# Patient Record
Sex: FEMALE | URBAN - METROPOLITAN AREA
[De-identification: names, ages, dates, MRNs, and addresses within clinical notes are randomized per-mention and may not be internally consistent; named-entity substitution may affect disease eponyms.]

---

## 2020-10-09 ENCOUNTER — HOSPITAL ENCOUNTER (OUTPATIENT)
Age: 70
Setting detail: SPECIMEN
Discharge: HOME | End: 2020-10-09
Payer: COMMERCIAL

## 2020-10-12 LAB — SARS-COV-2, NAA: NOT DETECTED

## 2020-10-13 ENCOUNTER — TELEPHONE (OUTPATIENT)
Dept: PRIMARY CARE CLINIC | Age: 70
End: 2020-10-13

## 2020-10-20 ENCOUNTER — HOSPITAL ENCOUNTER (OUTPATIENT)
Age: 70
Setting detail: SPECIMEN
Discharge: HOME | End: 2020-10-20
Payer: COMMERCIAL

## 2020-10-23 LAB — SARS-COV-2, NAA: NOT DETECTED

## 2020-11-18 ENCOUNTER — HOSPITAL ENCOUNTER (OUTPATIENT)
Age: 70
Setting detail: SPECIMEN
Discharge: HOME | End: 2020-11-18
Payer: COMMERCIAL

## 2020-11-21 LAB — SARS-COV-2, NAA: NOT DETECTED

## 2020-12-03 ENCOUNTER — HOSPITAL ENCOUNTER (OUTPATIENT)
Age: 70
Setting detail: SPECIMEN
Discharge: HOME | End: 2020-12-03
Payer: COMMERCIAL

## 2020-12-06 LAB — SARS-COV-2, NAA: NOT DETECTED

## 2020-12-10 ENCOUNTER — HOSPITAL ENCOUNTER (OUTPATIENT)
Age: 70
Setting detail: SPECIMEN
Discharge: HOME | End: 2020-12-10
Payer: COMMERCIAL

## 2020-12-12 LAB — SARS-COV-2, NAA: NOT DETECTED

## 2020-12-17 ENCOUNTER — HOSPITAL ENCOUNTER (OUTPATIENT)
Age: 70
Setting detail: SPECIMEN
Discharge: HOME | End: 2020-12-17
Payer: COMMERCIAL

## 2020-12-18 LAB — SARS-COV-2, NAA: NOT DETECTED

## 2020-12-21 ENCOUNTER — TELEPHONE (OUTPATIENT)
Dept: PRIMARY CARE CLINIC | Age: 70
End: 2020-12-21

## 2020-12-30 ENCOUNTER — HOSPITAL ENCOUNTER (OUTPATIENT)
Age: 70
Setting detail: SPECIMEN
Discharge: HOME | End: 2020-12-30
Payer: COMMERCIAL

## 2021-01-02 LAB — SARS-COV-2, NAA: NOT DETECTED

## 2021-01-07 ENCOUNTER — HOSPITAL ENCOUNTER (OUTPATIENT)
Age: 71
Setting detail: SPECIMEN
Discharge: HOME | End: 2021-01-07
Payer: COMMERCIAL

## 2021-01-09 LAB — SARS-COV-2, NAA: NOT DETECTED

## 2021-01-10 ENCOUNTER — TELEPHONE (OUTPATIENT)
Dept: PRIMARY CARE CLINIC | Age: 71
End: 2021-01-10

## 2021-01-20 ENCOUNTER — HOSPITAL ENCOUNTER (OUTPATIENT)
Age: 71
Setting detail: SPECIMEN
Discharge: HOME | End: 2021-01-20
Payer: COMMERCIAL

## 2021-01-22 LAB
SARS-COV-2, RAPID: NORMAL
SARS-COV-2: NORMAL
SARS-COV-2: NOT DETECTED
SOURCE: NORMAL

## 2021-01-23 ENCOUNTER — TELEPHONE (OUTPATIENT)
Dept: PRIMARY CARE CLINIC | Age: 71
End: 2021-01-23

## 2021-02-06 ENCOUNTER — HOSPITAL ENCOUNTER (OUTPATIENT)
Age: 71
Setting detail: SPECIMEN
Discharge: HOME | End: 2021-02-06
Payer: COMMERCIAL

## 2021-02-08 LAB
SARS-COV-2, RAPID: NORMAL
SARS-COV-2: NORMAL
SARS-COV-2: NOT DETECTED
SOURCE: NORMAL

## 2021-02-24 ENCOUNTER — HOSPITAL ENCOUNTER (OUTPATIENT)
Age: 71
Setting detail: SPECIMEN
Discharge: HOME | End: 2021-02-24
Payer: COMMERCIAL

## 2021-02-25 LAB
SARS-COV-2: NORMAL
SARS-COV-2: NOT DETECTED
SOURCE: NORMAL

## 2021-02-26 ENCOUNTER — TELEPHONE (OUTPATIENT)
Dept: PRIMARY CARE CLINIC | Age: 71
End: 2021-02-26

## 2021-03-03 ENCOUNTER — HOSPITAL ENCOUNTER (OUTPATIENT)
Age: 71
Setting detail: SPECIMEN
Discharge: HOME | End: 2021-03-03
Payer: COMMERCIAL

## 2021-03-04 LAB
SARS-COV-2: NORMAL
SARS-COV-2: NOT DETECTED
SOURCE: NORMAL

## 2021-03-05 ENCOUNTER — TELEPHONE (OUTPATIENT)
Dept: PRIMARY CARE CLINIC | Age: 71
End: 2021-03-05

## 2023-06-21 ENCOUNTER — HOSPITAL ENCOUNTER
Dept: HOSPITAL 101 - CT | Age: 73
End: 2023-06-21
Payer: COMMERCIAL

## 2023-06-21 DIAGNOSIS — M40.204: ICD-10-CM

## 2023-06-21 DIAGNOSIS — R91.1: Primary | ICD-10-CM

## 2023-06-21 PROCEDURE — 71250 CT THORAX DX C-: CPT

## 2023-06-21 PROCEDURE — 72072 X-RAY EXAM THORAC SPINE 3VWS: CPT

## 2023-06-21 NOTE — CT_ITS
The [address] [address] 
     [phone] 
  
  
Patient Name: 
SHAHEEN CHRISTINA 
  
MRN: [id number]    YOB: 1950    Sex: F 
Assigned Patient Location: CT 
Current Patient Location: CT 
Accession/Order Number: [id number] 
Exam Date: 6/21/2023  13:29    Report Date: 6/21/2023  14:34 
  
At the request of: 
SHARON PAINTER   
  
Procedure:  CT chest wo con 
  
EXAMINATION: CT chest wo con  
  
HISTORY: Lung nodule R91.1 ; follow-up right lower lobe nodule 
  
COMPARISON: CT chest 1/11/2023 
  
TECHNIQUE: Multi-planar CT images were obtained without and/or with IV  
contrast  
as indicated by examination type. Axial, Coronal, and Sagittal images. Dose  
reduction techniques were achieved by using automated exposure control and/or  
adjustment of mA and/or kV according to patient size and/or use of iterative  
reconstruction technique. 
  
  
FINDINGS: 
LUNGS: Stable 5 mm nodule within the right upper lobe adjacent the minor  
fissure. Minimal emphysematous changes. No acute infiltrates. 
PLEURA: No mass, effusion, or pneumothorax. 
VASCULATURE: No abnormality. 
GIFTY: A few calcified right hilar lymph nodes suggestive chronic granulomatous  
  
disease. 
MEDIASTINUM: No mass or adenopathy. 
CARDIAC: Atherosclerotic coronary artery disease. No pericardial effusion. 
AORTA: No aneurysm or dissection. 
CHEST WALL: No mass or axillary adenopathy. 
BONES: No bone lesion or fracture. 
LIMITED ABDOMEN: No suspicious findings Limited images of the upper abdomen. 
OTHER: Negative. 
  
IMPRESSION: 
  
1. Stable 5 mm nodule within right upper lobe (inadvertently stated to be  
within right lower lobe on prior study). Consider follow-up imaging in one  
year  
to document stability. 
  
  
Electronically authenticated by: JULIA HAIDER   Date: 6/21/2023  14:34

## 2023-06-21 NOTE — XR_ITS
The [place] [id number] 
     [phone] 
  
  
Patient Name: 
SHAHEEN CHRISTINA 
  
MRN: [id number]    YOB: 1950    Sex: F 
Assigned Patient Location: CT 
Current Patient Location: CT 
Accession/Order Number: [id number] 
Exam Date: 6/21/2023  13:15    Report Date: 6/21/2023  14:16 
  
At the request of: 
SHARON PAINTER   
  
Procedure:  XR thoracic spine 3V 
  
EXAMINATION: XR thoracic spine 3V  
  
HISTORY: Unspecified kyphosis, thoracic region M40.204 
  
COMPARISON: No relevant comparison available.  
  
FINDINGS:  
BONES: Mild right convex curvature of thoracic spine. Multilevel degenerative  
endplate changes. No compression fracture, spondylolisthesis, bone lesion.  
DISC SPACES: Multilevel moderate-marked narrowing of the mid and lower  
thoracic  
spine.  
PARASPINOUS: Negative. No paraspinous abnormality is seen.  
OTHER: Negative.  
  
IMPRESSION:  
  
1. Moderate degenerative changes of the mid-lower thoracic spine. No  
appreciable acute abnormality. 
  
  
Electronically authenticated by: JULIA HAIDER   Date: 6/21/2023  14:16

## 2023-09-18 ENCOUNTER — HOSPITAL ENCOUNTER (EMERGENCY)
Age: 73
Discharge: HOME | End: 2023-09-18
Payer: COMMERCIAL

## 2023-09-18 VITALS
SYSTOLIC BLOOD PRESSURE: 110 MMHG | RESPIRATION RATE: 18 BRPM | HEART RATE: 72 BPM | OXYGEN SATURATION: 96 % | TEMPERATURE: 97.88 F | DIASTOLIC BLOOD PRESSURE: 68 MMHG

## 2023-09-18 VITALS — BODY MASS INDEX: 17.8 KG/M2

## 2023-09-18 DIAGNOSIS — Z79.82: ICD-10-CM

## 2023-09-18 DIAGNOSIS — Z79.899: ICD-10-CM

## 2023-09-18 DIAGNOSIS — R55: ICD-10-CM
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
## 2023-09-18 DIAGNOSIS — S01.01XA: Primary | ICD-10-CM

## 2023-09-18 LAB
ADD MANUAL DIFF: NO
ANION GAP: 19
BLOOD UREA NITROGEN: 13 MG/DL (ref 7–18)
CALCIUM: 8.9 MG/DL (ref 8.5–10.1)
CARBON DIOXIDE: 21.1 MMOL/L (ref 21–32)
CHLORIDE: 97 MMOL/L (ref 98–107)
CO2 BLD-SCNC: 21.1 MMOL/L (ref 21–32)
ESTIMATED GFR (AFRICAN AMERICA: >60 (ref 60–?)
ESTIMATED GFR (NON-AFRICAN AME: >60 (ref 60–?)
GLUCOSE BLD-MCNC: 96 MG/DL (ref 74–106)
HCT VFR BLD CALC: 41.2 % (ref 36–48)
HEMATOCRIT: 41.2 % (ref 36–48)
HEMOGLOBIN: 14.1 G/DL (ref 12–16)
IMMATURE GRANULOCYTES ABS AUTO: 0.02 10^3/UL (ref 0–0.03)
IMMATURE GRANULOCYTES PCT AUTO: 0.3 % (ref 0–0.5)
LYMPHOCYTES  ABSOLUTE AUTO: 0.8 10^3/UL (ref 1.2–3.8)
MCV RBC: 95.8 FL (ref 81–99)
MEAN CORPUSCULAR HEMOGLOBIN: 32.8 PG (ref 26.7–34)
MEAN CORPUSCULAR HGB CONC: 34.2 G/DL (ref 29.9–35.2)
MEAN CORPUSCULAR VOLUME: 95.8 FL (ref 81–99)
PLATELET # BLD: 257 10^3/UL (ref 150–450)
PLATELET COUNT: 257 10^3/UL (ref 150–450)
POTASSIUM SERPLBLD-SCNC: 4.1 MMOL/L (ref 3.5–5.1)
POTASSIUM: 4.1 MMOL/L (ref 3.5–5.1)
RED BLOOD COUNT: 4.3 10^6/UL (ref 4.2–5.4)
SODIUM BLD-SCNC: 133 MMOL/L (ref 136–145)
SODIUM: 133 MMOL/L (ref 136–145)
WBC # BLD: 7.4 10^3/UL (ref 4–11)
WHITE BLOOD COUNT: 7.4 10^3/UL (ref 4–11)

## 2023-09-18 PROCEDURE — 70450 CT HEAD/BRAIN W/O DYE: CPT

## 2023-09-18 PROCEDURE — 12001 RPR S/N/AX/GEN/TRNK 2.5CM/<: CPT

## 2023-09-18 PROCEDURE — 99285 EMERGENCY DEPT VISIT HI MDM: CPT

## 2023-09-18 PROCEDURE — 36415 COLL VENOUS BLD VENIPUNCTURE: CPT

## 2023-09-18 PROCEDURE — 93005 ELECTROCARDIOGRAM TRACING: CPT

## 2023-09-18 PROCEDURE — 85025 COMPLETE CBC W/AUTO DIFF WBC: CPT

## 2023-09-18 PROCEDURE — 80048 BASIC METABOLIC PNL TOTAL CA: CPT

## 2023-09-18 NOTE — ED.GENADUL1
HPI - General Adult
General
Chief complaint: Head Injury
Stated complaint: UPPER EXTREMITY INJURY TO HEAD
Time Seen by Provider: 09/18/23 10:54
Source: patient
Mode of arrival: walk-in
History of Present Illness
HPI narrative: 
73-year-old female presents for syncope and head injury. She was in her bathroom and she passed out. He hit right side of her head and has some dried blood. She doesn't have a headache or neck pain. She is on a blood thinner. No other injury was 
sustained. She states she feels fine and that this is not a big deal. No chest pain or palpitations. No vomiting or ddiarrhea or fever.
Related Data
Home Medications

 Medication  Instructions  Recorded  Confirmed
aspirin 81 mg chewable tablet 1 tab PO QDAY 09/18/23 09/18/23
atorvastatin 80 mg tablet 80 mg PO QDAY 09/18/23 09/18/23
clopidogrel 75 mg tablet 75 mg PO QDAY 09/18/23 09/18/23
dapagliflozin propanediol 10 mg 10 mg PO QDAY 09/18/23 09/18/23
tablet (Farxiga)   
isosorbide mononitrate 30 mg 30 mg PO QAM 09/18/23 09/18/23
tablet,extended release 24 hr   
lisinopril 2.5 mg tablet 2.5 mg PO QDAY 09/18/23 09/18/23
metoprolol succinate 25 mg 25 mg PO QDAY 09/18/23 09/18/23
tablet,extended release 24 hr   
spironolactone 25 mg tablet 25 mg PO QAM 09/18/23 09/18/23


Allergies

Allergy/AdvReac Type Severity Reaction Status Date / Time
EES AdvReac Intermediate  Uncoded 09/18/23 10:47



Review of Systems
ROS  
 Narrative A ten point review of systems is negative except as noted above.   

Exam
Narrative
Exam Narrative: 
Nurses note and vital signs reviewed and patient is not hypoxic.

General:  The patient appears well and in no apparent distress.  Patient is resting comfortably on cart.
Skin:  Warm, dry, no pallor noted.  There is no rash noted.
Head:  Normocephalic, ***********
Eye: Normal conjunctiva, no drainage
Ears, Nose, Mouth, and Throat: oral mucosa is moist. Nares patent. 
Cardiovascular:  Regular Rate and Rhythm
Respiratory:  Patient is in no distress, no accessory muscle use, lungs are clear to auscultation, no wheezing, rales or rhonchi
Back:  non-tender, cervical spine nontender
GI:  , ostomy in place, nontender
Musculoskeletal: palpable tenderness to her extremities
Neurological:  A&O x4, normal speech
Psychiatric:  Cooperative
Constitutional
Vital Signs, click to edit/add: 

Last Vital Signs

Temp  97.8 F   09/18/23 10:45
Pulse  72   09/18/23 10:45
Resp  18   09/18/23 10:45
BP  110/68   09/18/23 10:45
Pulse Ox  96   09/18/23 10:45




Course
Vital Signs
Vital signs: 

Vital Signs

Temperature  97.8 F   09/18/23 10:45
Pulse Rate  72   09/18/23 10:45
Respiratory Rate  18   09/18/23 10:45
Blood Pressure  110/68   09/18/23 10:45
Pulse Oximetry  96   09/18/23 10:45



Temperature  97.8 F   09/18/23 10:45
Pulse Rate  72   09/18/23 10:45
Respiratory Rate  18   09/18/23 10:45
Blood Pressure  110/68   09/18/23 10:45
Pulse Oximetry  96   09/18/23 10:45




Medical Decision Making
MDM Narrative
Medical decision making narrative: 
CAT scan negative. Staples placed. They are to be removed in ten days. Tetanus status is up-to-date already. The rest of her workup negative as well. Treatment diagnosis and follow-up were discussed with the patient. She doesn't feel dizzy or 
lightheaded.
Differential Diagnosis
Differential Diagnosis: intracranial hemorrhage, syncope, anemia, acute kidney injury
Lab Data
Lab results reviewed: Yes I reviewed the patient's lab results
Labs: 

Lab Results

  09/18/23 Range/Units
  11:17 
WBC  7.4  (4.0-11.0)  10^3/uL
RBC  4.30  (4.20-5.40)  10^6/uL
Hgb  14.1  (12.0-16.0)  g/dL
Hct  41.2  (36.0-48.0)  %
MCV  95.8  (81.0-99.0)  fL
MCH  32.8  (26.7-34.0)  pg
MCHC  34.2  (29.9-35.2)  g/dL
RDW  12.9  (11.0-15.0)  %
Plt Count  257  (150-450)  10^3/uL
MPV  8.9 L  (9.5-13.5)  fL
Neut % (Auto)  76.3 H  (43.0-75.0)  %
Lymph % (Auto)  10.4 L  (20.5-60.0)  %
Mono % (Auto)  12.3 H  (1.7-12.0)  %
Eos % (Auto)  0.3 L  (0.9-7.0)  %
Baso % (Auto)  0.4  (0.2-2.0)  %
Neut # (Auto)  5.6  (1.4-6.5)  10^3/uL
Lymph # (Auto)  0.8 L  (1.2-3.8)  10^3/uL
Mono # (Auto)  0.9 H  (0.3-0.8)  10^3/uL
Eos # (Auto)  0.0  (0.0-0.7)  10^3/uL
Baso # (Auto)  0.0  (0.0-0.1)  10^3/uL
Abs Immat Gran (auto)  0.02  (0.00-0.03)  10^3/uL
Imm/Tot Granulo (auto)  0.3  (0.0-0.5)  %
Sodium  133 L  (136-145)  mmol/L
Potassium  4.1  (3.5-5.1)  mmol/L
Chloride  97 L  ()  mmol/L
Carbon Dioxide  21.1  (21.0-32.0)  mmol/L
Anion Gap  19.0  
BUN  13.0  (7.0-18.0)  mg/dL
Creatinine  0.82  (0.55-1.02)  mg/dL
Est GFR ( Amer)  >60  (>=60)  
Est GFR (Non-Af Amer)  >60  (>=60)  
BUN/Creatinine Ratio  15.9  
Glucose  96  ()  mg/dL
Calcium  8.9  (8.5-10.1)  mg/dL



Imaging Data
CT scan - head: 
      Radiologist's impression: 
Procedure:  CT head/brain wo con
EXAM: CT scan of the
 head 
without contrast. 
Dose reduction technique
 used: Automated exposure
 control and/or adjustment of
the mA and/or kV according
 to patient size
 and/or use of
 iterative 
reconstruction technique.
REASON FOR EXAM: head injury
COMPARISON: None
FINDINGS: 
No intracranial hemorrhage, mass
 effect, midline shift, fractures or
 evidence 
of acute
 ischemic
 infarct.
 No 
hydrocephalus. Minimal generalized cerebral
 and 
cerebellar volume loss. Minimal small vessel gliosis. Paranasal sinuses and
mastoid air
 cells
 are
 clear.
Remainder unremarkable.
IMPRESSION:
No acute intracranial
 abnormalities. 
Electronically authenticated by: MICHELE PARRISH   Date: 9/18/2023  11:38
ECG Data
Attestation: I personally reviewed and interpreted this ECG as follows: (EKG on my interpretation shows sinus rhythm with a rate of 71)

Discharge Plan
Discharge
Chief Complaint: Head Injury

Clinical Impression:
 Syncope, Laceration of scalp


Patient Disposition: Home, Self-Care

Time of Disposition Decision: 12:50

Condition: Good

Mode of Transportation: Private Vehicle

Prescriptions / Home Meds:
No Action
  aspirin 81 mg tablet,chewable 
   1 tab PO QDAY 
  clopidogrel 75 mg tablet 
   75 mg PO QDAY 
  isosorbide mononitrate 30 mg tablet extended release 24 hr 
   30 mg PO QAM 
  lisinopril 2.5 mg tablet 
   2.5 mg PO QDAY 
  metoprolol succinate 25 mg tablet extended release 24 hr 
   25 mg PO QDAY 
  spironolactone 25 mg tablet 
   25 mg PO QAM 
  Farxiga 10 mg tablet 
   10 mg PO QDAY 
  atorvastatin 80 mg tablet 
   80 mg PO QDAY 

Instructions:  Laceration (ED), Syncope (ED), Staple Care (ED)

Additional Instructions:
Staples to be removed in ten days. Do not wash hair for twenty-four hours.

Stand Alone Forms:  Portal Instructions

Referrals:
SHARON PAINTER [Primary Care Provider] - 1 week

## 2023-09-18 NOTE — CT_ITS
The [address] [address] 
     [phone] 
  
  
Patient Name: 
SHAHEEN CHRISTINA 
  
MRN: [id number]    YOB: 1950    Sex: F 
Assigned Patient Location: ER 
Current Patient Location: ER 
Accession/Order Number: [id number] 
Exam Date: 9/18/2023  11:23    Report Date: 9/18/2023  11:38 
  
At the request of: 
ROSA JIMENEZ   
  
Procedure:  CT head/brain wo con 
  
EXAM: CT scan of the head without contrast.  
Dose reduction technique used: Automated exposure control and/or adjustment of  
  
the mA and/or kV according to patient size and/or use of iterative  
reconstruction technique. 
  
REASON FOR EXAM: head injury 
  
COMPARISON: None 
  
FINDINGS:  
No intracranial hemorrhage, mass effect, midline shift, fractures or evidence  
of acute ischemic infarct. No hydrocephalus. Minimal generalized cerebral and  
cerebellar volume loss. Minimal small vessel gliosis. Paranasal sinuses and  
mastoid air cells are clear.  
  
Remainder unremarkable. 
  
ORDER #: [id number] CT/CT head/brain wo con  
IMPRESSION:  
No acute intracranial abnormalities.   
   
   
   
Electronically authenticated by: MICHELE PARRISH   Date: 9/18/2023  11:38

## 2023-09-18 NOTE — ECG_ITS
The [facility] 
                                        
                                       Test Date:    2023 
Pat Name:     SHAHEEN CHRISTINA       Department:    
Patient ID:   [id number]               Room:         - 
Gender:       Female                   Technician:    
:          1950               Requested By: SHARON PAINTER 
Order Number: [id number]              Reading MD:   DARLENE WHEATLEY 
                                 Measurements 
Intervals                              Axis           
Rate:         71                       P:            50 
GA:           168                      QRS:          78 
QRSD:         94                       T:            53 
QT:           396                                     
QTc:          418                                     
                           Interpretive Statements 
1100 Sinus rhythm 
3523 Possible lateral myocardial infarction, probably old 
7300 Indeterminate axis 
8102 Low QRS voltage in chest leads 
9150 **  abnormal ECG  ** 
No previous ECG available for comparison 
Electronically Signed On 2023 6:50:54 EDT by DARLENE WHEATLEY

## 2023-09-18 NOTE — PC.NURSE
Patient was at home taking a shower when she said she doesn't know what happened but she had a syncopal episode. patient lives with daughter who was at the home did not witness fall but states she heard a bang and came in to find patient on floor. 
LOC briefly when patient passed out. unknown what patient hit head on. laceration and dried blood in hair above right ear. patient denies any other symptoms or feeling of dizziness prior to. patient states she did not eat or drink this morning 
because she did  not feel like it. patient states she uses anticoagulants. family doctor is dr. hardy. sees [facility] at [place]. patient has two cardiac blockages, patient states she has a current stent placed and is supposed to see her 
cardiologist next week for possible second stent placement. patient brought in with son. denies any needs. states nothing hurts but her head. does not want to be in er long. does not want extensive treatments but is agreeable. iv placed, urine 
obtained, patient ambulated on her own with assistance from son. ekg obtained and rotating vitals

## 2024-03-06 ENCOUNTER — HOSPITAL ENCOUNTER
Dept: HOSPITAL 101 - NM | Age: 74
Discharge: HOME | End: 2024-03-06
Payer: COMMERCIAL

## 2024-03-06 DIAGNOSIS — I10: ICD-10-CM

## 2024-03-06 DIAGNOSIS — I25.10: Primary | ICD-10-CM

## 2024-03-06 DIAGNOSIS — R06.09: ICD-10-CM

## 2024-03-06 LAB
ANION GAP: 14
BLOOD UREA NITROGEN: 9 MG/DL (ref 7–18)
CALCIUM: 9 MG/DL (ref 8.5–10.1)
CARBON DIOXIDE: 27.4 MMOL/L (ref 21–32)
CHLORIDE: 100 MMOL/L (ref 98–107)
ESTIMATED GFR (AFRICAN AMERICA: >60 (ref 60–?)
ESTIMATED GFR (NON-AFRICAN AME: >60 (ref 60–?)
GLUCOSE: 104 MG/DL (ref 74–106)
POTASSIUM: 4.4 MMOL/L (ref 3.5–5.1)
SODIUM: 137 MMOL/L (ref 136–145)

## 2024-03-06 PROCEDURE — 36415 COLL VENOUS BLD VENIPUNCTURE: CPT

## 2024-03-06 PROCEDURE — 80048 BASIC METABOLIC PNL TOTAL CA: CPT
